# Patient Record
Sex: FEMALE | Race: BLACK OR AFRICAN AMERICAN | Employment: FULL TIME | URBAN - METROPOLITAN AREA
[De-identification: names, ages, dates, MRNs, and addresses within clinical notes are randomized per-mention and may not be internally consistent; named-entity substitution may affect disease eponyms.]

---

## 2019-12-16 ENCOUNTER — TELEPHONE (OUTPATIENT)
Dept: BARIATRICS/WEIGHT MGMT | Age: 49
End: 2019-12-16

## 2019-12-23 ENCOUNTER — APPOINTMENT (OUTPATIENT)
Dept: CT IMAGING | Age: 49
End: 2019-12-23
Payer: COMMERCIAL

## 2019-12-23 ENCOUNTER — APPOINTMENT (OUTPATIENT)
Dept: GENERAL RADIOLOGY | Age: 49
End: 2019-12-23
Payer: COMMERCIAL

## 2019-12-23 ENCOUNTER — HOSPITAL ENCOUNTER (EMERGENCY)
Age: 49
Discharge: HOME | End: 2019-12-23
Payer: COMMERCIAL

## 2019-12-23 VITALS
TEMPERATURE: 98 F | WEIGHT: 260 LBS | DIASTOLIC BLOOD PRESSURE: 79 MMHG | RESPIRATION RATE: 18 BRPM | SYSTOLIC BLOOD PRESSURE: 128 MMHG | OXYGEN SATURATION: 100 % | HEIGHT: 70 IN | HEART RATE: 70 BPM | BODY MASS INDEX: 37.22 KG/M2
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
## 2019-12-23 DIAGNOSIS — M25.511 ACUTE PAIN OF RIGHT SHOULDER: ICD-10-CM

## 2019-12-23 DIAGNOSIS — M54.50 ACUTE RIGHT-SIDED LOW BACK PAIN WITHOUT SCIATICA: ICD-10-CM

## 2019-12-23 LAB
INTERPRETATION: NORMAL
PREGNANCY, URINE: NEGATIVE
SPECIFIC GRAVITY, URINE: 1.02 (ref 1–1.03)

## 2019-12-23 PROCEDURE — 6360000002 HC RX W HCPCS: Performed by: PHYSICIAN ASSISTANT

## 2019-12-23 PROCEDURE — 99284 EMERGENCY DEPT VISIT MOD MDM: CPT

## 2019-12-23 PROCEDURE — 73030 X-RAY EXAM OF SHOULDER: CPT

## 2019-12-23 PROCEDURE — 96372 THER/PROPH/DIAG INJ SC/IM: CPT

## 2019-12-23 PROCEDURE — 81025 URINE PREGNANCY TEST: CPT

## 2019-12-23 PROCEDURE — 72131 CT LUMBAR SPINE W/O DYE: CPT

## 2019-12-23 RX ORDER — KETOROLAC TROMETHAMINE 30 MG/ML
15 INJECTION, SOLUTION INTRAMUSCULAR; INTRAVENOUS ONCE
Status: COMPLETED | OUTPATIENT
Start: 2019-12-23 | End: 2019-12-23

## 2019-12-23 RX ADMIN — KETOROLAC TROMETHAMINE 15 MG: 30 INJECTION, SOLUTION INTRAMUSCULAR at 19:38

## 2019-12-23 SDOH — HEALTH STABILITY: MENTAL HEALTH: HOW OFTEN DO YOU HAVE A DRINK CONTAINING ALCOHOL?: NEVER

## 2019-12-23 ASSESSMENT — PAIN DESCRIPTION - PAIN TYPE
TYPE: ACUTE PAIN
TYPE: ACUTE PAIN;CHRONIC PAIN

## 2019-12-23 ASSESSMENT — PAIN SCALES - GENERAL
PAINLEVEL_OUTOF10: 0
PAINLEVEL_OUTOF10: 6
PAINLEVEL_OUTOF10: 7

## 2020-01-08 LAB
ALBUMIN SERPL-MCNC: 4.2 G/DL
ALP BLD-CCNC: 60 U/L
ALT SERPL-CCNC: 16 U/L
ANION GAP SERPL CALCULATED.3IONS-SCNC: NORMAL MMOL/L
AST SERPL-CCNC: 18 U/L
AVERAGE GLUCOSE: ABNORMAL
BASOPHILS ABSOLUTE: 0 /ΜL
BASOPHILS RELATIVE PERCENT: 0 %
BILIRUB SERPL-MCNC: 0.2 MG/DL (ref 0.1–1.4)
BUN BLDV-MCNC: 10 MG/DL
CALCIUM SERPL-MCNC: 9.4 MG/DL
CHLORIDE BLD-SCNC: 104 MMOL/L
CO2: 23 MMOL/L
CREAT SERPL-MCNC: 0.68 MG/DL
EOSINOPHILS ABSOLUTE: 0.1 /ΜL
EOSINOPHILS RELATIVE PERCENT: 2 %
FERRITIN: 28 NG/ML (ref 9–150)
FOLATE: 17.3
GFR CALCULATED: 120
GLUCOSE BLD-MCNC: 94 MG/DL
HBA1C MFR BLD: 5.9 %
HCT VFR BLD CALC: 35.3 % (ref 36–46)
HEMOGLOBIN: 11.5 G/DL (ref 12–16)
IRON: 40
LYMPHOCYTES ABSOLUTE: 2.6 /ΜL
LYMPHOCYTES RELATIVE PERCENT: 40 %
MCH RBC QN AUTO: 28.2 PG
MCHC RBC AUTO-ENTMCNC: 32.6 G/DL
MCV RBC AUTO: 87 FL
MONOCYTES ABSOLUTE: 0.7 /ΜL
MONOCYTES RELATIVE PERCENT: 11 %
NEUTROPHILS ABSOLUTE: 3.1 /ΜL
NEUTROPHILS RELATIVE PERCENT: 47 %
PLATELET # BLD: 293 K/ΜL
PMV BLD AUTO: ABNORMAL FL
POTASSIUM SERPL-SCNC: 4 MMOL/L
RBC # BLD: 4.08 10^6/ΜL
SODIUM BLD-SCNC: 140 MMOL/L
TOTAL IRON BINDING CAPACITY: 331
TOTAL PROTEIN: 7.5
TSH SERPL DL<=0.05 MIU/L-ACNC: 1.29 UIU/ML
VITAMIN B-12: 322
VITAMIN D 25-HYDROXY: 22.1
VITAMIN D2, 25 HYDROXY: ABNORMAL
VITAMIN D3,25 HYDROXY: ABNORMAL
WBC # BLD: 6.5 10^3/ML

## 2020-02-05 ENCOUNTER — OFFICE VISIT (OUTPATIENT)
Dept: BARIATRICS/WEIGHT MGMT | Age: 50
End: 2020-02-05
Payer: COMMERCIAL

## 2020-02-05 VITALS
HEIGHT: 70 IN | HEART RATE: 62 BPM | SYSTOLIC BLOOD PRESSURE: 146 MMHG | DIASTOLIC BLOOD PRESSURE: 92 MMHG | BODY MASS INDEX: 41.95 KG/M2 | WEIGHT: 293 LBS

## 2020-02-05 PROCEDURE — 99204 OFFICE O/P NEW MOD 45 MIN: CPT | Performed by: NURSE PRACTITIONER

## 2020-02-05 RX ORDER — LISINOPRIL 5 MG/1
5 TABLET ORAL DAILY
COMMUNITY

## 2020-02-05 NOTE — PROGRESS NOTES
Subjective     Chief Complaint   Patient presents with    New Patient     NP asking about new medication       Vitals:    02/05/20 1617   BP: (!) 146/92   Pulse: 62     Wt Readings from Last 3 Encounters:   02/05/20 (!) 319 lb 12.8 oz (145.1 kg)   12/23/19 260 lb (117.9 kg)     The patient first recognized that she had a weight problem about 5 years ago. The patient's lowest weight in the last five years was 240 lbs, and the highest weight in the last five years was 319. lbs. Weight Loss Program History:  The patient states she has tried adipex  The most weight lost ever with diet and exercise was 10 Lbs, but unfortunately only kept them of for 4months. These attempts have been temporarily successful with weight loss, but have failed to sustain adequate weight loss. Pt has high bp when using most weight loss medications but is trying to show she is trying she has bcbs and they will not approve otherwise. No Psychiatric history. Adipex over 16 years ago. States did well but HTN. No History of eating disorders  3 Juices per day frequently, borderline DM. No ETOH or drug use. Eats out a lot, kurt[employer] and fast food. Busy with daughter in sports. Water intake 2 bottles per day. Teacher at [employer]. Right knee pain, currently getting knee injections. States bone against bone. HTN borderline. Mia's life is affected by weight related to below comorbid conditions. The patient has also tried self directed diet and exercise in attempts to sustain weight loss. Comorbid Conditions:  Significant diseases effecting this patient are   Past Medical History:   Diagnosis Date    Asthma     Hypertension      Thoroughly reviewed the patient's medical history, family history, social history and review of systems with the patient today in the office. Please see medical record for pertinent positives. Allergies:  No Known Allergies    Past Surgical History:  History reviewed.  No pertinent surgical history. Family History:  No family history on file. Social History:  Social History     Socioeconomic History    Marital status:      Spouse name: Not on file    Number of children: Not on file    Years of education: Not on file    Highest education level: Not on file   Occupational History    Not on file   Social Needs    Financial resource strain: Not on file    Food insecurity:     Worry: Not on file     Inability: Not on file    Transportation needs:     Medical: Not on file     Non-medical: Not on file   Tobacco Use    Smoking status: Never Smoker    Smokeless tobacco: Never Used   Substance and Sexual Activity    Alcohol use: Not Currently     Frequency: Never    Drug use: Not on file    Sexual activity: Not Currently   Lifestyle    Physical activity:     Days per week: Not on file     Minutes per session: Not on file    Stress: Not on file   Relationships    Social connections:     Talks on phone: Not on file     Gets together: Not on file     Attends Hoahaoism service: Not on file     Active member of club or organization: Not on file     Attends meetings of clubs or organizations: Not on file     Relationship status: Not on file    Intimate partner violence:     Fear of current or ex partner: Not on file     Emotionally abused: Not on file     Physically abused: Not on file     Forced sexual activity: Not on file   Other Topics Concern    Not on file   Social History Narrative    Not on file       Review of Systems - History obtained from the patient.     Review of Systems - General ROS: negative for - chills, fever, hot flashes or night sweats  ENT ROS: negative for - headaches, oral lesions, sore throat, vertigo or visual changes  Allergy and Immunology ROS: negative for - hives or nasal congestion  Endocrine ROS: negative for - hair pattern changes, mood swings or polydipsia/polyuria  Respiratory ROS: no cough, shortness of breath, or wheezing  Cardiovascular ROS: no chest pain or dyspnea on exertion, +HTN  Gastrointestinal ROS: no abdominal pain, change in bowel habits, or black or bloody stools  Genito-Urinary ROS: no dysuria, incontinence, trouble voiding, or hematuria  Musculoskeletal ROS: right knee pain, injection given. Neurological ROS: negative for - behavioral changes, dizziness, headaches, impaired coordination/balance, numbness/tingling or seizures  Dermatological ROS: negative for - eczema or nail changes  Psychological ROS: negative for - anxiety, depression or suicidal ideation     Objective     Physical Exam   Constitutional: Oriented to person, place, and time and well-developed, well-nourished, and in no distress. No distress. Obese   HENT: Corrective lenses. Head: Normocephalic and atraumatic. Eyes: Conjunctivae are normal. Pupils are equal, round, and reactive to light. Neck: Normal range of motion. Neck supple. Cardiovascular: Normal rate, regular rhythm, normal heart sounds and intact distal pulses. No murmur heard. Pulmonary/Chest: Effort normal and breath sounds normal. No respiratory distress. Abdominal: Soft. Bowel sounds are normal. Exhibits no distension. There is no tenderness. Large. Musculoskeletal: Mild edema +1 bilateral.  Lymphadenopathy: Deferred. Neurological: She is alert and oriented to person, place, and time. No cranial nerve deficit. Skin: Skin is warm. She is not diaphoretic. Psychiatric: Mood, memory, affect and judgment normal.     Assessment  and Plan  Discussed candidacy for weight loss medication: BMI of >30 at 46. Patient has failed to lose 5% of body weight for 20 years. Belviq/Lorcaserin Hydrochloride is a serotonin 2C receptor agonist indicated as an adjunct to a reeducated-calorie diet and increased physical activity for chronic weight management in adults. Drug is considered CIV. Was studied in geriatric >60, 1 year long study.  Patient was given [facility] medication information handout today on Belviq. Dosing: 10 mg BID or 20 mg daily. Do not crush or divide. Can be taken with or without food. Laymond Coupe for patients >70 yo with no hx of renal impairment. Contraindications: Pregnancy (category X), CHF, valve problems, bradycardia, diabetes, SSRI's, SNRI's, triptan use, tricyclic antidepressants, MAOIs, and any hypersensitivity. Warning/precautions, serotonin syndrome, valvular heart disease, psychiatric disorders, DM, decrease in HR, priapism, heme changes, prolactin elevation and pulm htn. Possible SE: Nausea, diarrhea, constipation, dizziness, dry mouth, hypoglycemia, headache, and fatigue. Plan    1. Essential hypertension  - Borderline controlled. - COnitnue lisinopril.   - Belviq may help with HTN control. Study some reducation in HTN/HR>     2. Morbid obesity due to excess calories ([facility] [address]  - Discussed weight loss program with patient and the metabolic components of obesity and insulin resistance over time. - Ultimately will need to change overall eating behaviors to have success in continued management with weight loss. - Will continue to journal food items and discussed the below recommendations to patient. - Encouraged her to weigh daily and work towards a goal of 1-2 pounds weekly. - Need to call for lab records, RTC 2 weeks for new medication group class with completed workup and RTC 6 wks with NP. Planning to start Belviq. Consider others once loses weight and HTN better controlled etc.     Patient was encouraged to journal all food intake using blue journal given. Keep calorie level at approximately 2445-1493. Protein intake is to be a minimum of 60 grams per day. Water drinking was encouraged with a goal of 64oz-128oz daily. Beverages to be calorie free except for milk. Every other beverage should be water. They are to avoid soda.      I spent 45 minutes with patient and More than 50% of the office visit today was spent in face to face counseling regarding diet and exercise, counting calories, complying with the diet recommendations, and complying with the work up of dietary counseling. Patient counseled on the benefits of treatment plan at length while in the office today. Patient states an understanding and willingness to proceed with the recommended weight loss plan. No orders of the defined types were placed in this encounter. No orders of the defined types were placed in this encounter. Follow Up:  Return in about 2 weeks (around 2/19/2020) for New Medication Group Class.     Ban Cordova, CNP

## 2020-02-13 ENCOUNTER — TELEPHONE (OUTPATIENT)
Dept: BARIATRICS/WEIGHT MGMT | Age: 50
End: 2020-02-13

## 2020-02-13 NOTE — TELEPHONE ENCOUNTER
Fidelia Code came in to provide information. [facility] phone number to contact for pt to receive medication is [phone] [facility].

## 2020-02-17 ENCOUNTER — TELEPHONE (OUTPATIENT)
Dept: BARIATRICS/WEIGHT MGMT | Age: 50
End: 2020-02-17

## 2020-02-17 NOTE — TELEPHONE ENCOUNTER
Called patient in regards to [address], she has not been prescribed this medication yet. She voiced understanding about the information, a new medication will be discussed at group class.

## 2020-02-25 ENCOUNTER — TELEPHONE (OUTPATIENT)
Dept: BARIATRICS/WEIGHT MGMT | Age: 50
End: 2020-02-25

## 2020-02-25 NOTE — TELEPHONE ENCOUNTER
----- Message from STACEY Zavaleta CNP sent at 2/24/2020  3:13 PM EST -----  Please call on lab records. Need asap or reschedule till next week.    Jessica Sorto

## 2020-02-25 NOTE — TELEPHONE ENCOUNTER
Called patient to advise we have not received labs yet from her PCP. No answer and no voicemail.  Called Dr. Umer Dickinson office they will fax most recent labs from 1/2020

## 2020-02-26 ENCOUNTER — OFFICE VISIT (OUTPATIENT)
Dept: BARIATRICS/WEIGHT MGMT | Age: 50
End: 2020-02-26
Payer: COMMERCIAL

## 2020-02-26 VITALS
BODY MASS INDEX: 41.95 KG/M2 | OXYGEN SATURATION: 98 % | HEIGHT: 70 IN | SYSTOLIC BLOOD PRESSURE: 134 MMHG | HEART RATE: 78 BPM | WEIGHT: 293 LBS | DIASTOLIC BLOOD PRESSURE: 88 MMHG

## 2020-02-26 PROCEDURE — 99215 OFFICE O/P EST HI 40 MIN: CPT | Performed by: NURSE PRACTITIONER

## 2020-02-26 ASSESSMENT — ENCOUNTER SYMPTOMS
WHEEZING: 0
SHORTNESS OF BREATH: 0
ABDOMINAL PAIN: 0
TROUBLE SWALLOWING: 0
ABDOMINAL DISTENTION: 0
NAUSEA: 0
RHINORRHEA: 0
CHEST TIGHTNESS: 0
EYE PAIN: 0
DIARRHEA: 0

## 2020-02-26 NOTE — PROGRESS NOTES
Wendy Preston Chris  1970  51 y.o. SUBJECT BK:    Chief Complaint   Patient presents with    Weight Management     Medication Group Class     Past Medical History:   Diagnosis Date    Asthma     Hypertension      No past surgical history on file. Current Outpatient Medications   Medication Sig Dispense Refill    orlistat (XENICAL) 120 MG capsule Take 1 capsule by mouth 3 times daily (with meals) Start with BID and then increase after 5 days to TID. 90 capsule 0    lisinopril (PRINIVIL;ZESTRIL) 5 MG tablet Take 5 mg by mouth daily       No current facility-administered medications for this visit. No family history on file. No Known Allergies     HPI  HPI: Daly Antoine presents today for new medication group class by this provider. Patient had an initial individual provider consult and is here for their group education class. At initial consult weight loss medication was decided based on current BMI, prior attempts, and exclusion criteria based on past medical history. Patient had work up completed and was reviewed with patient today. No new medications, recent illnesses or new medical history. A1C 5.9. Review of Systems   Constitutional: Negative. Negative for appetite change, fatigue and fever. HENT: Negative for congestion, dental problem, hearing loss, rhinorrhea and trouble swallowing. Eyes: Negative for pain. Respiratory: Negative for chest tightness, shortness of breath and wheezing. Cardiovascular: Negative for chest pain, palpitations and leg swelling. Htn   Gastrointestinal: Negative for abdominal distention, abdominal pain, diarrhea and nausea. Endocrine: Negative for cold intolerance and polydipsia. Pre dm. Genitourinary: Negative for difficulty urinating and frequency. Musculoskeletal: Negative for arthralgias and gait problem. Skin: Negative for rash. Allergic/Immunologic: Negative for environmental allergies.    Neurological: Negative for dizziness, seizures and syncope. Hematological: Does not bruise/bleed easily. Psychiatric/Behavioral: Negative for behavioral problems and suicidal ideas. OBJECTIVE:     /88 (Site: Right Upper Arm, Position: Standing, Cuff Size: Large Adult)   Pulse 78   Ht 5' 10\" (1.778 m)   Wt (!) 319 lb 8 oz (144.9 kg)   SpO2 98%   BMI 45.84 kg/m²   Wt Readings from Last 3 Encounters:   02/26/20 (!) 319 lb 8 oz (144.9 kg)   02/05/20 (!) 319 lb 12.8 oz (145.1 kg)   12/23/19 260 lb (117.9 kg)       Physical Exam  Vitals signs and nursing note reviewed. Constitutional:       Appearance: She is well-developed. Comments: Obese   HENT:      Head: Normocephalic and atraumatic. Right Ear: Hearing and ear canal normal.      Left Ear: Hearing and ear canal normal.      Nose: Nose normal.      Mouth/Throat:      Pharynx: Uvula midline. Eyes:      Conjunctiva/sclera: Conjunctivae normal.      Pupils: Pupils are equal, round, and reactive to light. Neck:      Musculoskeletal: Normal range of motion. Cardiovascular:      Rate and Rhythm: Normal rate and regular rhythm. Heart sounds: Normal heart sounds. Pulmonary:      Effort: Pulmonary effort is normal.      Breath sounds: Normal breath sounds. No decreased breath sounds, wheezing, rhonchi or rales. Abdominal:      General: Bowel sounds are normal.      Palpations: Abdomen is soft. Tenderness: There is no abdominal tenderness. Musculoskeletal: Normal range of motion. General: No tenderness. Comments: In all 4 extremities. Skin:     General: Skin is warm and dry. Findings: No rash. Neurological:      Mental Status: She is alert and oriented to person, place, and time. GCS: GCS eye subscore is 4. GCS verbal subscore is 5. GCS motor subscore is 6. Motor: No abnormal muscle tone. Psychiatric:         Behavior: Behavior normal.         Judgment: Judgment normal.         ASSESSMENT/ PLAN:    1.

## 2020-03-18 ENCOUNTER — OFFICE VISIT (OUTPATIENT)
Dept: BARIATRICS/WEIGHT MGMT | Age: 50
End: 2020-03-18
Payer: COMMERCIAL

## 2020-03-18 VITALS
DIASTOLIC BLOOD PRESSURE: 88 MMHG | HEART RATE: 67 BPM | HEIGHT: 70 IN | BODY MASS INDEX: 41.95 KG/M2 | SYSTOLIC BLOOD PRESSURE: 140 MMHG | WEIGHT: 293 LBS | OXYGEN SATURATION: 97 %

## 2020-03-18 PROCEDURE — 99213 OFFICE O/P EST LOW 20 MIN: CPT | Performed by: NURSE PRACTITIONER

## 2020-03-18 ASSESSMENT — ENCOUNTER SYMPTOMS
SHORTNESS OF BREATH: 0
WHEEZING: 0
EYE PAIN: 0
CHEST TIGHTNESS: 0
ABDOMINAL PAIN: 0
DIARRHEA: 0
TROUBLE SWALLOWING: 0
ABDOMINAL DISTENTION: 0
BACK PAIN: 1
NAUSEA: 0
RHINORRHEA: 0

## 2020-03-18 NOTE — PROGRESS NOTES
avoid high fat foods. Will increase to orlistat if patient has coverage and tolerates well. - orlistat (XENICAL) 120 MG capsule; Take 1 capsule by mouth 3 times daily (with meals) Start with BID and then increase after 5 days to TID. Dispense: 90 capsule; Refill: 0  - RTC 1 month with NP.     2. Essential hypertension  - Elevated today, did not take medication.   - PCP managing. Continue lisinopril. No orders of the defined types were placed in this encounter. Return in about 1 month (around 4/18/2020).     Coni Moran, CNP

## 2020-03-24 ENCOUNTER — TELEPHONE (OUTPATIENT)
Dept: BARIATRICS/WEIGHT MGMT | Age: 50
End: 2020-03-24

## 2020-04-29 ENCOUNTER — HOSPITAL ENCOUNTER (OUTPATIENT)
Age: 50
Setting detail: SPECIMEN
Discharge: HOME | End: 2020-04-29
Payer: COMMERCIAL

## 2020-04-29 PROCEDURE — 87071 CULTURE AEROBIC QUANT OTHER: CPT

## 2020-05-04 LAB
CULTURE: ABNORMAL
GRAM SMEAR: ABNORMAL
Lab: ABNORMAL
SPECIMEN: ABNORMAL

## 2020-10-21 ENCOUNTER — HOSPITAL ENCOUNTER (OUTPATIENT)
Dept: NUCLEAR MEDICINE | Age: 50
Discharge: HOME | End: 2020-10-21
Payer: COMMERCIAL

## 2020-10-21 VITALS — WEIGHT: 293 LBS | BODY MASS INDEX: 45.48 KG/M2

## 2020-10-21 PROCEDURE — A9537 TC99M MEBROFENIN: HCPCS | Performed by: FAMILY MEDICINE

## 2020-10-21 PROCEDURE — 6360000002 HC RX W HCPCS: Performed by: FAMILY MEDICINE

## 2020-10-21 PROCEDURE — 78227 HEPATOBIL SYST IMAGE W/DRUG: CPT

## 2020-10-21 PROCEDURE — 3430000000 HC RX DIAGNOSTIC RADIOPHARMACEUTICAL: Performed by: FAMILY MEDICINE

## 2020-10-21 PROCEDURE — 2580000003 HC RX 258: Performed by: FAMILY MEDICINE

## 2020-10-21 RX ADMIN — SODIUM CHLORIDE 2.88 MCG: 9 INJECTION, SOLUTION INTRAVENOUS at 14:05

## 2020-10-21 RX ADMIN — Medication 5 MILLICURIE: at 13:05

## 2020-10-26 ENCOUNTER — OFFICE VISIT (OUTPATIENT)
Dept: SURGERY | Age: 50
End: 2020-10-26
Payer: COMMERCIAL

## 2020-10-26 VITALS
OXYGEN SATURATION: 99 % | WEIGHT: 293 LBS | BODY MASS INDEX: 43.4 KG/M2 | SYSTOLIC BLOOD PRESSURE: 142 MMHG | HEART RATE: 76 BPM | RESPIRATION RATE: 14 BRPM | TEMPERATURE: 98.1 F | DIASTOLIC BLOOD PRESSURE: 84 MMHG | HEIGHT: 69 IN

## 2020-10-26 PROCEDURE — 99204 OFFICE O/P NEW MOD 45 MIN: CPT | Performed by: SURGERY

## 2020-10-26 ASSESSMENT — ENCOUNTER SYMPTOMS
CONSTIPATION: 0
NAUSEA: 1
PHOTOPHOBIA: 0
ABDOMINAL PAIN: 1
BACK PAIN: 0
APNEA: 0
CHOKING: 0
STRIDOR: 0
RECTAL PAIN: 0
ANAL BLEEDING: 0
EYE REDNESS: 0
COLOR CHANGE: 0
EYE ITCHING: 0
SORE THROAT: 0

## 2020-10-26 NOTE — PROGRESS NOTES
Chief Complaint   Patient presents with    New Patient     NP Gallbladder ref Dr Nasima Mejia:  HPI:  Patientcomplains of abdominal pain. Pain is located in the LUQ, epigastric with radiation to back. The pain is described as cramping and pressure-like. Onset was 6 months ago. Symptoms have been gradually worsening since. Aggravating factors: fatty foods. Associated symptoms: nausea. The patient denies chills and fever. I have reviewed the patient's(pertinent information to this visit) medical history, family history(scanned in  the Media tab under \"patient questioner\"), social history and reviewof systems with the patient today in the office. No past surgical history on file. Past Medical History:   Diagnosis Date    Asthma     Hypertension      No family history on file.   Social History     Socioeconomic History    Marital status:      Spouse name: Not on file    Number of children: Not on file    Years of education: Not on file    Highest education level: Not on file   Occupational History    Not on file   Social Needs    Financial resource strain: Not on file    Food insecurity     Worry: Not on file     Inability: Not on file    Transportation needs     Medical: Not on file     Non-medical: Not on file   Tobacco Use    Smoking status: Never Smoker    Smokeless tobacco: Never Used   Substance and Sexual Activity    Alcohol use: Not Currently     Frequency: Never    Drug use: Not on file    Sexual activity: Not Currently   Lifestyle    Physical activity     Days per week: Not on file     Minutes per session: Not on file    Stress: Not on file   Relationships    Social connections     Talks on phone: Not on file     Gets together: Not on file     Attends [employer] service: Not on file     Active member of club or organization: Not on file     Attends meetings of clubs or organizations: Not on file     Relationship status: Not on file    Intimate partner violence Musculoskeletal: Normal range of motion and neck supple. Cardiovascular:      Rate and Rhythm: Normal rate. Pulmonary:      Effort: Pulmonary effort is normal.   Abdominal:      General: There is no distension. Palpations: Abdomen is soft. There is no mass. Tenderness: There is no abdominal tenderness. There is no guarding or rebound. Musculoskeletal: Normal range of motion. Skin:     General: Skin is warm. Neurological:      Mental Status: She is alert and oriented to person, place, and time. ASSESSMENT:  1. Epigastric abdominal pain        PLAN:  Treatment: Will proceed with EGD. If EGDs negative then will proceed with laparoscopic cholecystectomy. Her ultrasound and HIDA scan were negative. Patient did have symptoms during HIDA scan. Patient counseled on risks, benefits, and alternatives of treatment plan at length today. Patient states an understanding and willingness to proceed with plan. No orders of the defined types were placed in this encounter. No orders of the defined types were placed in this encounter. Follow Up:  No follow-ups on file.       Ghassan Marvin MD

## 2020-10-30 ENCOUNTER — TELEPHONE (OUTPATIENT)
Dept: SURGERY | Age: 50
End: 2020-10-30

## 2020-10-30 NOTE — TELEPHONE ENCOUNTER
SENT SMS MESSAGE - LEFT MESSAGE FOR Mia Perez REGARDING SURGERY (EGD) SCHEDULED @ [facility].  NOTIFIED OF DATES, TIMES AND LOCATION    PHONE ASSESSMENT   COVID 11/9/20 @ 330- GUESTS ARE NOT ALLOWED  SURGERY - 11/13/20 @ 900  P/O - 11/19/20 @ 115    NPO AFTER MIDNIGHT  HOLD BLOOD THINNERS

## 2020-12-14 ENCOUNTER — HOSPITAL ENCOUNTER (OUTPATIENT)
Dept: WOUND CARE | Age: 50
Discharge: HOME | End: 2020-12-14
Payer: COMMERCIAL

## 2020-12-14 VITALS
HEIGHT: 70 IN | SYSTOLIC BLOOD PRESSURE: 184 MMHG | TEMPERATURE: 97.5 F | RESPIRATION RATE: 16 BRPM | HEART RATE: 72 BPM | WEIGHT: 285 LBS | DIASTOLIC BLOOD PRESSURE: 93 MMHG | BODY MASS INDEX: 40.8 KG/M2

## 2020-12-14 PROBLEM — L02.412 ABSCESS OF AXILLA, LEFT: Status: ACTIVE | Noted: 2020-12-14

## 2020-12-14 PROBLEM — T14.8XXA NONHEALING NONSURGICAL WOUND WITH FAT LAYER EXPOSED: Status: ACTIVE | Noted: 2020-12-14

## 2020-12-14 PROCEDURE — 87070 CULTURE OTHR SPECIMN AEROBIC: CPT

## 2020-12-14 PROCEDURE — 10060 I&D ABSCESS SIMPLE/SINGLE: CPT

## 2020-12-14 PROCEDURE — 99203 OFFICE O/P NEW LOW 30 MIN: CPT | Performed by: NURSE PRACTITIONER

## 2020-12-14 PROCEDURE — 87186 SC STD MICRODIL/AGAR DIL: CPT

## 2020-12-14 PROCEDURE — 87075 CULTR BACTERIA EXCEPT BLOOD: CPT

## 2020-12-14 PROCEDURE — 87077 CULTURE AEROBIC IDENTIFY: CPT

## 2020-12-14 PROCEDURE — 99213 OFFICE O/P EST LOW 20 MIN: CPT

## 2020-12-14 PROCEDURE — 10060 I&D ABSCESS SIMPLE/SINGLE: CPT | Performed by: NURSE PRACTITIONER

## 2020-12-14 RX ORDER — BACITRACIN ZINC AND POLYMYXIN B SULFATE 500; 1000 [USP'U]/G; [USP'U]/G
OINTMENT TOPICAL ONCE
Status: CANCELLED | OUTPATIENT
Start: 2020-12-14 | End: 2020-12-14

## 2020-12-14 RX ORDER — BACITRACIN, NEOMYCIN, POLYMYXIN B 400; 3.5; 5 [USP'U]/G; MG/G; [USP'U]/G
OINTMENT TOPICAL ONCE
Status: CANCELLED | OUTPATIENT
Start: 2020-12-14 | End: 2020-12-14

## 2020-12-14 RX ORDER — LIDOCAINE 50 MG/G
OINTMENT TOPICAL ONCE
Status: CANCELLED | OUTPATIENT
Start: 2020-12-14 | End: 2020-12-14

## 2020-12-14 RX ORDER — LIDOCAINE HYDROCHLORIDE 40 MG/ML
SOLUTION TOPICAL ONCE
Status: CANCELLED | OUTPATIENT
Start: 2020-12-14 | End: 2020-12-14

## 2020-12-14 RX ORDER — GINSENG 100 MG
CAPSULE ORAL ONCE
Status: CANCELLED | OUTPATIENT
Start: 2020-12-14 | End: 2020-12-14

## 2020-12-14 RX ORDER — LIDOCAINE HYDROCHLORIDE 20 MG/ML
JELLY TOPICAL ONCE
Status: CANCELLED | OUTPATIENT
Start: 2020-12-14 | End: 2020-12-14

## 2020-12-14 RX ORDER — POTASSIUM CHLORIDE 750 MG/1
10 CAPSULE, EXTENDED RELEASE ORAL 2 TIMES DAILY
COMMUNITY
Start: 2020-12-14

## 2020-12-14 RX ORDER — GENTAMICIN SULFATE 1 MG/G
OINTMENT TOPICAL ONCE
Status: CANCELLED | OUTPATIENT
Start: 2020-12-14 | End: 2020-12-14

## 2020-12-14 RX ORDER — CLOBETASOL PROPIONATE 0.5 MG/G
OINTMENT TOPICAL ONCE
Status: CANCELLED | OUTPATIENT
Start: 2020-12-14 | End: 2020-12-14

## 2020-12-14 RX ORDER — BETAMETHASONE DIPROPIONATE 0.05 %
OINTMENT (GRAM) TOPICAL ONCE
Status: CANCELLED | OUTPATIENT
Start: 2020-12-14 | End: 2020-12-14

## 2020-12-14 ASSESSMENT — PAIN DESCRIPTION - DESCRIPTORS: DESCRIPTORS: BURNING

## 2020-12-14 ASSESSMENT — PAIN DESCRIPTION - ONSET: ONSET: ON-GOING

## 2020-12-14 ASSESSMENT — PAIN DESCRIPTION - FREQUENCY: FREQUENCY: CONTINUOUS

## 2020-12-14 ASSESSMENT — PAIN DESCRIPTION - PAIN TYPE: TYPE: ACUTE PAIN

## 2020-12-14 ASSESSMENT — PAIN - FUNCTIONAL ASSESSMENT: PAIN_FUNCTIONAL_ASSESSMENT: ACTIVITIES ARE NOT PREVENTED

## 2020-12-14 ASSESSMENT — PAIN DESCRIPTION - ORIENTATION: ORIENTATION: LEFT

## 2020-12-14 ASSESSMENT — PAIN DESCRIPTION - LOCATION: LOCATION: ABDOMEN

## 2020-12-14 ASSESSMENT — PAIN DESCRIPTION - PROGRESSION: CLINICAL_PROGRESSION: NOT CHANGED

## 2020-12-14 ASSESSMENT — PAIN SCALES - GENERAL: PAINLEVEL_OUTOF10: 4

## 2020-12-14 NOTE — PROGRESS NOTES
[facility] Initial Visit      Hernandez Alonzo  AGE: 53 y.o. GENDER: female  : 1970  EPISODE DATE:  2020   Referred by: Dr. Faviola Montano:     Connor Loyd LEFT AXILLA     HISTORY of PRESENT ILLNESS      Hernandez Alonzo is a 53 y.o. female who presents to the [address] [place] [address] for an initial visit for evaluation and treatment of Acute non-healing surgical  wound of abdomen/pelvis region post hysterectomy 2020. The condition is of mild severity. The ulcer has been present for aproximately 1-2 weeks. She also has an abscess in the left axilla present for approximately 4 days. The underlying cause is thought to be nonhealing surgical and infection. The patients care to date has included evaluation at the walk in clinic and Bactrim DS for 10 days which was started 2020. The patient has significant underlying medical conditions as below.      Wound Pain Timing/Severity: waxing and waning, moderate  Quality of pain: aching, tender  Severity of pain:  4 / 10   Modifying Factors: obesity and prediabetes  Associated Signs/Symptoms: edema  The wound pain is related to the abscess, she has no pain with the nonhealing surgical.        PAST MEDICAL HISTORY        Diagnosis Date    Asthma     Hypertension        PAST SURGICAL HISTORY    Past Surgical History:   Procedure Laterality Date    HYSTERECTOMY         FAMILY HISTORY    Family History   Problem Relation Age of Onset    Diabetes Maternal Grandfather        SOCIAL HISTORY    Social History     Tobacco Use    Smoking status: Never Smoker    Smokeless tobacco: Never Used   Substance Use Topics    Alcohol use: Not Currently     Frequency: Never    Drug use: Never       ALLERGIES    No Known Allergies    MEDICATIONS    Current Outpatient Medications on File Prior to Encounter   Medication Sig Dispense Refill    potassium chloride (MICRO-K) 10 MEQ extended release capsule Take 10 mEq by mouth 2 times daily      orlistat (XENICAL) 120 MG capsule Take 1 capsule by mouth 3 times daily (with meals) Start with BID and then increase after 5 days to TID. 90 capsule 0    lisinopril (PRINIVIL;ZESTRIL) 5 MG tablet Take 5 mg by mouth daily       No current facility-administered medications on file prior to encounter. PROBLEM LIST    Patient Active Problem List   Diagnosis    WD-Nonhealing nonsurgical abdomen wound with fat layer exposed    WD-Abscess of axilla, left       REVIEW OF SYSTEMS    Constitutional: negative for anorexia, chills, fatigue, fevers, malaise, sweats and weight loss  Respiratory: negative for cough, dyspnea on exertion, hemoptysis, pleurisy/chest pain, shortness of breath, sputum, stridor and wheezing  Cardiovascular: negative for chest pain, chest pressure/discomfort, dyspnea, exertional chest pressure/discomfort, fatigue, irregular heart beat, near-syncope, orthopnea, palpitations, paroxysmal nocturnal dyspnea, syncope and tachypnea  Integument/breast: positive for skin lesion(s) nonhealing surgical wound abdomen/pelvis and abscess left axilla. Objective:      BP (!) 184/93   Pulse 72   Temp 97.5 °F (36.4 °C) (Temporal)   Resp 16   Ht 5' 10\" (1.778 m)   Wt 285 lb (129.3 kg)   BMI 40.89 kg/m²     PHYSICAL EXAM  General Appearance: alert and oriented to person, place and time, well-developed and well-nourished, in no acute distress  Pulmonary/Chest: clear to auscultation bilaterally- no wheezes, rales or rhonchi, normal air movement, no respiratory distress  Cardiovascular: normal rate, normal S1 and S2, no gallops and intact distal pulses  Dermatologic exam: Visual inspection of the periwound reveals the skin to be edematous  Wound exam: see wound description below in procedure note      Assessment:       Bailey Noriega  appears to have a non-healing wound of the abdomen/pelvis and abscess left axilla.  The etiology of the wound is felt to be non-healing surgical and infection. There are multiple complicating factors including edema, obesity and prediabetes. A comprehensive wound management program would be helpful to heal this wound. Assessments completed include fall risk and nutritional, functional,and psychological status. At this time appropriate care would include: periodic debridement and wound care as below.      Problem List Items Addressed This Visit     WD-Nonhealing nonsurgical abdomen wound with fat layer exposed    WD-Abscess of axilla, left          Post  Debridement Measurements:  Wound 12/14/20 Pelvis Anterior #1 Abdomen (Active)   Wound Image   12/14/20 1044   Dressing Status New dressing applied 12/14/20 1120   Wound Cleansed Cleansed with saline 12/14/20 1044   Wound Length (cm) 0.3 cm 12/14/20 1044   Wound Width (cm) 1 cm 12/14/20 1044   Wound Depth (cm) 0.1 cm 12/14/20 1044   Wound Surface Area (cm^2) 0.3 cm^2 12/14/20 1044   Wound Volume (cm^3) 0.03 cm^3 12/14/20 1044   Distance Tunneling (cm) 0 cm 12/14/20 1044   Tunneling Position ___ O'Clock 0 12/14/20 1044   Undermining Starts ___ O'Clock 0 12/14/20 1044   Undermining Ends___ O'Clock 0 12/14/20 1044   Undermining Maxium Distance (cm) 0 12/14/20 1044   Wound Assessment Pink/red 12/14/20 1044   Drainage Amount Moderate 12/14/20 1044   Drainage Description Yellow 12/14/20 1044   Odor None 12/14/20 1044   Coni-wound Assessment Intact 12/14/20 1044   Margins Defined edges 12/14/20 1044   Wound Thickness Description not for Pressure Injury Full thickness 12/14/20 1044   Number of days: 0       Wound 12/14/20 Left #2 Left Underarm (Active)   Wound Image   12/14/20 1044   Dressing Status New dressing applied 12/14/20 1120   Wound Cleansed Cleansed with saline 12/14/20 1044   Wound Length (cm) 0.5 cm 12/14/20 1044   Wound Width (cm) 1.5 cm 12/14/20 1044   Wound Depth (cm) 0.1 cm 12/14/20 1044   Wound Surface Area (cm^2) 0.75 cm^2 12/14/20 1044   Wound Volume (cm^3) 0.08 cm^3 12/14/20 1044   Distance Tunneling (cm) 0 cm 12/14/20 1044   Tunneling Position ___ O'Clock 0 12/14/20 1044   Undermining Starts ___ O'Clock 0 12/14/20 1044   Undermining Ends___ O'Clock 0 12/14/20 1044   Undermining Maxium Distance (cm) 0 12/14/20 1044   Wound Assessment Pink/red 12/14/20 1044   Drainage Amount Moderate 12/14/20 1044   Drainage Description Yellow 12/14/20 1044   Odor None 12/14/20 1044   Coni-wound Assessment Intact 12/14/20 1044   Margins Defined edges 12/14/20 1044   Wound Thickness Description not for Pressure Injury Full thickness 12/14/20 1044   Number of days: 0         Incision and Drainage Note    Type of Incision and Drainage:    [x] Simple    [] Complex    [] Hematoma/Seroma  [] Abscess soft tissue deep   [] Abscess ankle       The left axilla abscess was inspected and appeared acutely fluctuant and erythematous.     Performed by: STACEY Huddleston CNP    Consent obtained: Yes    Time out taken: Yes    Pain Control: Anesthetic: 5% Lidocaine Ointment Topical     Drainage Type: purulent    Instruments: cotton tip applicator and manual pressure    Location of Incision and Drainage:    Wound #: 2    Incision and Drainage Size cm  Wound 12/14/20 Pelvis Anterior #1 Abdomen (Active)   Wound Image   12/14/20 1044   Dressing Status New dressing applied 12/14/20 1120   Wound Cleansed Cleansed with saline 12/14/20 1044   Wound Length (cm) 0.3 cm 12/14/20 1044   Wound Width (cm) 1 cm 12/14/20 1044   Wound Depth (cm) 0.1 cm 12/14/20 1044   Wound Surface Area (cm^2) 0.3 cm^2 12/14/20 1044   Wound Volume (cm^3) 0.03 cm^3 12/14/20 1044   Distance Tunneling (cm) 0 cm 12/14/20 1044   Tunneling Position ___ O'Clock 0 12/14/20 1044   Undermining Starts ___ O'Clock 0 12/14/20 1044   Undermining Ends___ O'Clock 0 12/14/20 1044   Undermining Maxium Distance (cm) 0 12/14/20 1044   Wound Assessment Pink/red 12/14/20 1044   Drainage Amount Moderate 12/14/20 1044   Drainage Description Yellow 12/14/20 1044   Odor None 12/14/20 1044 Coni-wound Assessment Intact 12/14/20 1044   Margins Defined edges 12/14/20 1044   Wound Thickness Description not for Pressure Injury Full thickness 12/14/20 1044   Number of days: 0       Wound 12/14/20 Left #2 Left Underarm (Active)   Wound Image   12/14/20 1044   Dressing Status New dressing applied 12/14/20 1120   Wound Cleansed Cleansed with saline 12/14/20 1044   Wound Length (cm) 0.5 cm 12/14/20 1044   Wound Width (cm) 1.5 cm 12/14/20 1044   Wound Depth (cm) 0.1 cm 12/14/20 1044   Wound Surface Area (cm^2) 0.75 cm^2 12/14/20 1044   Wound Volume (cm^3) 0.08 cm^3 12/14/20 1044   Distance Tunneling (cm) 0 cm 12/14/20 1044   Tunneling Position ___ O'Clock 0 12/14/20 1044   Undermining Starts ___ O'Clock 0 12/14/20 1044   Undermining Ends___ O'Clock 0 12/14/20 1044   Undermining Maxium Distance (cm) 0 12/14/20 1044   Wound Assessment Pink/red 12/14/20 1044   Drainage Amount Moderate 12/14/20 1044   Drainage Description Yellow 12/14/20 1044   Odor None 12/14/20 1044   Coni-wound Assessment Intact 12/14/20 1044   Margins Defined edges 12/14/20 1044   Wound Thickness Description not for Pressure Injury Full thickness 12/14/20 1044   Number of days: 0        Culture sent: Yes     Bleeding:with a small amount of bleeding    Hemostasis Achieved: by pressure    Procedural Pain: 0  / 10     Post Procedural Pain: 0 / 10     Response to treatment: Well tolerated by patient. Culture obtained, continue Bactrim. Plan:     Discharge Instructions       PHYSICIAN ORDERS AND DISCHARGE INSTRUCTIONS    NOTE: Upon discharge from the [address], you will receive a patient experience survey. We would be grateful if you would take the time to fill this survey out.     Wound care order history:     FLORIDALMA's   Right       Left    Date:   Cultures:     Grafts:     Antibiotics:        Continuing wound care orders and information:                Residence:                Continue home health care with:    Your wound-care supplies will be provided by: Wound cleansing:     Do not scrub or use excessive force. Wash hands with soap and water before and after dressing changes. Prior to applying a clean dressing, cleanse wound with normal saline, wound cleanser, or mild soap and water. Ask the physician or nurse before getting the wound(s) wet in a shower    Daily Wound management:   Keep weight off wounds and reposition every 2 hours. Avoid standing for long periods of time. Apply wraps/stockings in AM and remove at bedtime. If swelling is present, elevate legs to the level of the heart or above for 30 minutes 4-5 times a day and/or when sitting. When taking antibiotics take entire prescription as ordered by physician do not stop taking until medicine is all gone. Orders for this week:  12/14/20     Abdominal Wound-- Clean with soap and water, Pat dry. Apply Delmis to wound bed, cover with ABD pad and secure with paper tape. Change Daily                  Left Underarm-- Clean with soap and water. Pat Dry. Cover with ABD pad and paper tape. Clean with cotton swab to keep wound open until it heals. Follow up with Pia MIRANDA  In 1 weeks in the wound care center  Call [phone] for any questions or concerns.   Date__________   Time____________        Treatment Note Wound 12/14/20 Pelvis Anterior #1 Abdomen-Dressing/Treatment: (abd, tape)  Wound 12/14/20 Left #2 Left Underarm-Dressing/Treatment: (delmis, abd, paper tape)    Written Patient Dismissal Instructions Given          Electronically signed by STACEY Manuel CNP on 12/14/2020 at 12:21 PM

## 2020-12-21 ENCOUNTER — HOSPITAL ENCOUNTER (OUTPATIENT)
Dept: WOUND CARE | Age: 50
Discharge: HOME | End: 2020-12-21
Payer: COMMERCIAL

## 2020-12-21 VITALS
RESPIRATION RATE: 16 BRPM | TEMPERATURE: 97.5 F | SYSTOLIC BLOOD PRESSURE: 182 MMHG | DIASTOLIC BLOOD PRESSURE: 69 MMHG | HEART RATE: 71 BPM

## 2020-12-21 LAB
CULTURE: ABNORMAL
CULTURE: ABNORMAL
Lab: ABNORMAL
SPECIMEN: ABNORMAL

## 2020-12-21 PROCEDURE — 99024 POSTOP FOLLOW-UP VISIT: CPT | Performed by: NURSE PRACTITIONER

## 2020-12-21 PROCEDURE — 99213 OFFICE O/P EST LOW 20 MIN: CPT

## 2020-12-21 RX ORDER — GINSENG 100 MG
CAPSULE ORAL ONCE
Status: CANCELLED | OUTPATIENT
Start: 2020-12-21 | End: 2020-12-21

## 2020-12-21 RX ORDER — SULFAMETHOXAZOLE AND TRIMETHOPRIM 800; 160 MG/1; MG/1
1 TABLET ORAL 2 TIMES DAILY
COMMUNITY
End: 2020-12-29

## 2020-12-21 RX ORDER — BACITRACIN, NEOMYCIN, POLYMYXIN B 400; 3.5; 5 [USP'U]/G; MG/G; [USP'U]/G
OINTMENT TOPICAL ONCE
Status: CANCELLED | OUTPATIENT
Start: 2020-12-21 | End: 2020-12-21

## 2020-12-21 RX ORDER — LIDOCAINE 50 MG/G
OINTMENT TOPICAL ONCE
Status: CANCELLED | OUTPATIENT
Start: 2020-12-21 | End: 2020-12-21

## 2020-12-21 RX ORDER — GENTAMICIN SULFATE 1 MG/G
OINTMENT TOPICAL ONCE
Status: CANCELLED | OUTPATIENT
Start: 2020-12-21 | End: 2020-12-21

## 2020-12-21 RX ORDER — BETAMETHASONE DIPROPIONATE 0.05 %
OINTMENT (GRAM) TOPICAL ONCE
Status: CANCELLED | OUTPATIENT
Start: 2020-12-21 | End: 2020-12-21

## 2020-12-21 RX ORDER — CLOBETASOL PROPIONATE 0.5 MG/G
OINTMENT TOPICAL ONCE
Status: CANCELLED | OUTPATIENT
Start: 2020-12-21 | End: 2020-12-21

## 2020-12-21 RX ORDER — LIDOCAINE HYDROCHLORIDE 40 MG/ML
SOLUTION TOPICAL ONCE
Status: CANCELLED | OUTPATIENT
Start: 2020-12-21 | End: 2020-12-21

## 2020-12-21 RX ORDER — BACITRACIN ZINC AND POLYMYXIN B SULFATE 500; 1000 [USP'U]/G; [USP'U]/G
OINTMENT TOPICAL ONCE
Status: CANCELLED | OUTPATIENT
Start: 2020-12-21 | End: 2020-12-21

## 2020-12-21 RX ORDER — LIDOCAINE HYDROCHLORIDE 20 MG/ML
JELLY TOPICAL ONCE
Status: CANCELLED | OUTPATIENT
Start: 2020-12-21 | End: 2020-12-21

## 2020-12-21 ASSESSMENT — PAIN DESCRIPTION - FREQUENCY: FREQUENCY: INTERMITTENT

## 2020-12-21 ASSESSMENT — PAIN DESCRIPTION - PROGRESSION: CLINICAL_PROGRESSION: NOT CHANGED

## 2020-12-21 ASSESSMENT — PAIN SCALES - GENERAL: PAINLEVEL_OUTOF10: 0

## 2020-12-21 ASSESSMENT — PAIN - FUNCTIONAL ASSESSMENT: PAIN_FUNCTIONAL_ASSESSMENT: PREVENTS OR INTERFERES SOME ACTIVE ACTIVITIES AND ADLS

## 2020-12-21 NOTE — PROGRESS NOTES
Wound Care Center Progress Note       Timothy Wright  AGE: 53 y.o. GENDER: female  : 1970  TODAY'S DATE:  2020        Subjective:     Chief Complaint   Patient presents with    Wound Check         HISTORY of PRESENT ILLNESS     Timothy Wright is a 53 y.o. female who presents to the [address] [place] [address] for evaluation and treatment of Acute non-healing surgical  wound of abdomen/pelvis region post hysterectomy 2020. The condition is of mild severity. She also has an abscess in the left axilla which has now resolved. The underlying cause is thought to be nonhealing surgical and infection. The patients care to date has included evaluation at the walk in clinic and Bactrim DS for 10 days which was started 2020.  The patient has significant underlying medical conditions as below.      Wound Pain Timing/Severity:none  Quality of pain:N/A  Severity of pain:  0 / 10   Modifying Factors: obesity and prediabetes  Associated Signs/Symptoms: edema    PAST MEDICAL HISTORY        Diagnosis Date    Asthma     Hypertension        PAST SURGICAL HISTORY    Past Surgical History:   Procedure Laterality Date    HYSTERECTOMY         FAMILY HISTORY    Family History   Problem Relation Age of Onset    Diabetes Maternal Grandfather        SOCIAL HISTORY    Social History     Tobacco Use    Smoking status: Never Smoker    Smokeless tobacco: Never Used   Substance Use Topics    Alcohol use: Not Currently     Frequency: Never    Drug use: Never       ALLERGIES    No Known Allergies    MEDICATIONS    Current Outpatient Medications on File Prior to Encounter   Medication Sig Dispense Refill    sulfamethoxazole-trimethoprim (BACTRIM DS;SEPTRA DS) 800-160 MG per tablet Take 1 tablet by mouth 2 times daily      potassium chloride (MICRO-K) 10 MEQ extended release capsule Take 10 mEq by mouth 2 times daily      orlistat (XENICAL) 120 MG capsule Take 1 capsule by mouth 3 times daily (with meals) Start with BID and then increase after 5 days to TID. 90 capsule 0    lisinopril (PRINIVIL;ZESTRIL) 5 MG tablet Take 5 mg by mouth daily       No current facility-administered medications on file prior to encounter. REVIEW OF SYSTEMS    Pertinent items are noted in HPI. Constitutional: Negative for systemic symptoms including fever, chills and malaise. Objective:      BP (!) 182/69   Pulse 71   Temp 97.5 °F (36.4 °C) (Temporal)   Resp 16     PHYSICAL EXAM    General: The patient is in no acute distress. Mental status:  Patient is appropriate, is  oriented to place and plan of care. Dermatologic exam: Visual inspection of the periwound reveals the skin to be normal in turgor and texture.   Wound exam:  see wound description below     All active wounds listed below with today's date are evaluated      Wound 12/14/20 Pelvis Anterior #1 Abdomen (Active)   Wound Image   12/14/20 1044   Dressing Status New dressing applied 12/21/20 1134   Wound Cleansed Wound cleanser 12/21/20 1119   Wound Length (cm) 0.1 cm 12/21/20 1119   Wound Width (cm) 0.7 cm 12/21/20 1119   Wound Depth (cm) 0.1 cm 12/21/20 1119   Wound Surface Area (cm^2) 0.07 cm^2 12/21/20 1119   Change in Wound Size % (l*w) 76.67 12/21/20 1119   Wound Volume (cm^3) 0.01 cm^3 12/21/20 1119   Wound Healing % 67 12/21/20 1119   Distance Tunneling (cm) 0 cm 12/21/20 1119   Tunneling Position ___ O'Clock 0 12/21/20 1119   Undermining Starts ___ O'Clock 0 12/21/20 1119   Undermining Ends___ O'Clock 0 12/21/20 1119   Undermining Maxium Distance (cm) 0 12/21/20 1119   Wound Assessment Pink/red 12/21/20 1119   Drainage Amount Small 12/21/20 1119   Drainage Description Clear 12/21/20 1119   Odor None 12/21/20 1119   Coni-wound Assessment Intact 12/21/20 1119   Margins Defined edges 12/21/20 1119   Wound Thickness Description not for Pressure Injury Full thickness 12/21/20 1119   Number of days: 7       Assessment:       Problem List Items Addressed This Visit

## 2020-12-29 ENCOUNTER — HOSPITAL ENCOUNTER (OUTPATIENT)
Dept: WOUND CARE | Age: 50
Discharge: HOME | End: 2020-12-29
Payer: COMMERCIAL

## 2020-12-29 VITALS
TEMPERATURE: 97.7 F | RESPIRATION RATE: 16 BRPM | SYSTOLIC BLOOD PRESSURE: 177 MMHG | DIASTOLIC BLOOD PRESSURE: 85 MMHG | HEART RATE: 64 BPM

## 2020-12-29 PROBLEM — L02.411 ABSCESS OF RIGHT AXILLA: Status: ACTIVE | Noted: 2020-12-29

## 2020-12-29 PROCEDURE — 99212 OFFICE O/P EST SF 10 MIN: CPT

## 2020-12-29 PROCEDURE — 99213 OFFICE O/P EST LOW 20 MIN: CPT | Performed by: NURSE PRACTITIONER

## 2020-12-29 RX ORDER — BETAMETHASONE DIPROPIONATE 0.05 %
OINTMENT (GRAM) TOPICAL ONCE
Status: CANCELLED | OUTPATIENT
Start: 2020-12-29 | End: 2020-12-29

## 2020-12-29 RX ORDER — GINSENG 100 MG
CAPSULE ORAL ONCE
Status: CANCELLED | OUTPATIENT
Start: 2020-12-29 | End: 2020-12-29

## 2020-12-29 RX ORDER — LIDOCAINE HYDROCHLORIDE 40 MG/ML
SOLUTION TOPICAL ONCE
Status: DISCONTINUED | OUTPATIENT
Start: 2020-12-29 | End: 2020-12-30 | Stop reason: HOSPADM

## 2020-12-29 RX ORDER — GENTAMICIN SULFATE 1 MG/G
OINTMENT TOPICAL ONCE
Status: CANCELLED | OUTPATIENT
Start: 2020-12-29 | End: 2020-12-29

## 2020-12-29 RX ORDER — LIDOCAINE HYDROCHLORIDE 40 MG/ML
SOLUTION TOPICAL ONCE
Status: CANCELLED | OUTPATIENT
Start: 2020-12-29 | End: 2020-12-29

## 2020-12-29 RX ORDER — LIDOCAINE HYDROCHLORIDE 20 MG/ML
JELLY TOPICAL ONCE
Status: CANCELLED | OUTPATIENT
Start: 2020-12-29 | End: 2020-12-29

## 2020-12-29 RX ORDER — BACITRACIN ZINC AND POLYMYXIN B SULFATE 500; 1000 [USP'U]/G; [USP'U]/G
OINTMENT TOPICAL ONCE
Status: CANCELLED | OUTPATIENT
Start: 2020-12-29 | End: 2020-12-29

## 2020-12-29 RX ORDER — LIDOCAINE 50 MG/G
OINTMENT TOPICAL ONCE
Status: CANCELLED | OUTPATIENT
Start: 2020-12-29 | End: 2020-12-29

## 2020-12-29 RX ORDER — BACITRACIN, NEOMYCIN, POLYMYXIN B 400; 3.5; 5 [USP'U]/G; MG/G; [USP'U]/G
OINTMENT TOPICAL ONCE
Status: CANCELLED | OUTPATIENT
Start: 2020-12-29 | End: 2020-12-29

## 2020-12-29 RX ORDER — CLOBETASOL PROPIONATE 0.5 MG/G
OINTMENT TOPICAL ONCE
Status: CANCELLED | OUTPATIENT
Start: 2020-12-29 | End: 2020-12-29

## 2020-12-29 RX ORDER — SULFAMETHOXAZOLE AND TRIMETHOPRIM 800; 160 MG/1; MG/1
1 TABLET ORAL 2 TIMES DAILY
Qty: 20 TABLET | Refills: 0 | Status: SHIPPED | OUTPATIENT
Start: 2020-12-29 | End: 2021-01-08

## 2020-12-29 ASSESSMENT — PAIN DESCRIPTION - PAIN TYPE: TYPE: ACUTE PAIN

## 2020-12-29 ASSESSMENT — PAIN SCALES - GENERAL: PAINLEVEL_OUTOF10: 0

## 2020-12-29 NOTE — PLAN OF CARE
Problem: Pain:  Goal: Pain level will decrease  Description: Pain level will decrease  Outcome: Ongoing  Goal: Control of acute pain  Description: Control of acute pain  Outcome: Ongoing  Goal: Control of chronic pain  Description: Control of chronic pain  Outcome: Ongoing ///

## 2020-12-29 NOTE — PROGRESS NOTES
Wound Care Center Progress Note       Kristyn Yu  AGE: 53 y.o. GENDER: female  : 1970  TODAY'S DATE:  2020        Subjective:     Chief Complaint   Patient presents with    Wound Check         HISTORY of PRESENT ILLNESS    Mia Perez is a 53 y.o. female who presents to the Wound Clinic for evaluation and treatment of Acute non-healing surgical  wound of abdomen/pelvis region post hysterectomy 2020 which is now healed. She also has an abscess in the left axilla which has now resolved.  She is developing an abscess in her right axilla, will start Bactrim DS for 2 days. The underlying cause is thought to be nonhealing surgical and infection.  The patients care to date has included evaluation at the walk in clinic and Bactrim DS for 10 days which was started 2020. The patient has significant underlying medical conditions as below.      Wound Pain Timing/Severity:none  Quality of pain:N/A  Severity of pain:  0 / 10   Modifying Factors: obesity and prediabetes  Associated Signs/Symptoms: edema           PAST MEDICAL HISTORY        Diagnosis Date    Asthma     Hypertension        PAST SURGICAL HISTORY    Past Surgical History:   Procedure Laterality Date    HYSTERECTOMY         FAMILY HISTORY    Family History   Problem Relation Age of Onset    Diabetes Maternal Grandfather        SOCIAL HISTORY    Social History     Tobacco Use    Smoking status: Never Smoker    Smokeless tobacco: Never Used   Substance Use Topics    Alcohol use: Not Currently     Frequency: Never    Drug use: Never       ALLERGIES    No Known Allergies    MEDICATIONS    Current Outpatient Medications on File Prior to Encounter   Medication Sig Dispense Refill    potassium chloride (MICRO-K) 10 MEQ extended release capsule Take 10 mEq by mouth 2 times daily      orlistat (XENICAL) 120 MG capsule Take 1 capsule by mouth 3 times daily (with meals) Start with BID and then increase after 5 days to TID.  80 capsule 0    lisinopril (PRINIVIL;ZESTRIL) 5 MG tablet Take 5 mg by mouth daily       No current facility-administered medications on file prior to encounter. REVIEW OF SYSTEMS    Pertinent items are noted in HPI. Constitutional: Negative for systemic symptoms including fever, chills and malaise. Objective:      BP (!) 177/85   Pulse 64   Temp 97.7 °F (36.5 °C) (Temporal)   Resp 16     PHYSICAL EXAM    General: The patient is in no acute distress. Mental status:  Patient is appropriate, is  oriented to place and plan of care. Dermatologic exam: Visual inspection of the periwound reveals the skin to be normal in turgor and texture. Wound exam:  see wound description below     All active wounds listed below with today's date are evaluated      Assessment:       Problem List Items Addressed This Visit     WD-Nonhealing nonsurgical abdomen wound with fat layer exposed - Primary    Relevant Medications    lidocaine (XYLOCAINE) 4 % external solution (Start on 12/29/2020 11:30 AM)    Other Relevant Orders    Supply: Wound Cleanser    WD-Abscess of axilla, left    Relevant Medications    lidocaine (XYLOCAINE) 4 % external solution (Start on 12/29/2020 11:30 AM)    Other Relevant Orders    Supply: Wound Cleanser    WD-Abscess of right axilla          Status of wound progress and description from last visit: Healed. Plan:     Discharge Instructions       PHYSICIAN ORDERS AND DISCHARGE INSTRUCTIONS     NOTE: Upon discharge from the [address], you will receive a patient experience survey.  We would be grateful if you would take the time to fill this survey out.     Wound care order history:                 FLORIDALMA's   Right       Left               Date:              Cultures:                Grafts:                Antibiotics:           Continuing wound care orders and information:                 Residence:                Continue home health care with:               Your wound-care supplies will be provided by:      Wound cleansing:               ZT not scrub or use excessive force.              Wash hands with soap and water before and after dressing changes.             VLDVD to applying a clean dressing, cleanse wound with normal saline, wound cleanser, or mild soap and water.               Ask the physician or nurse before getting the wound(s) wet in a shower     Daily Wound management:              Keep weight off wounds and reposition every 2 hours.              Avoid standing for long periods of time.              Apply wraps/stockings in AM and remove at bedtime.              If swelling is present, elevate legs to the level of the heart or above for 30 minutes 4-5 times a day and/or when sitting.                                      When taking antibiotics take entire prescription as ordered by physician do not stop taking until medicine is all gone.                                                           Orders for this week:  12/29/20                Abdominal Wound-- Clean with soap and water, Pat dry.                Discharged  Call [phone] for any questions or concerns.   Date__________   Time____________        Treatment Note      Written Patient Dismissal Instructions Given            Electronically signed by STACEY Whitfield CNP on 12/29/2020 at 11:18 AM

## 2022-01-06 ENCOUNTER — HOSPITAL ENCOUNTER (EMERGENCY)
Age: 52
Discharge: HOME | End: 2022-01-06
Attending: EMERGENCY MEDICINE
Payer: COMMERCIAL

## 2022-01-06 VITALS
SYSTOLIC BLOOD PRESSURE: 189 MMHG | BODY MASS INDEX: 41.47 KG/M2 | OXYGEN SATURATION: 99 % | DIASTOLIC BLOOD PRESSURE: 77 MMHG | HEART RATE: 56 BPM | TEMPERATURE: 96.6 F | HEIGHT: 69 IN | RESPIRATION RATE: 14 BRPM | WEIGHT: 280 LBS

## 2022-01-06 DIAGNOSIS — M62.838 SPASM OF MUSCLE: ICD-10-CM

## 2022-01-06 DIAGNOSIS — U07.1 COVID-19: ICD-10-CM

## 2022-01-06 DIAGNOSIS — S39.012A STRAIN OF LUMBAR REGION, INITIAL ENCOUNTER: Primary | ICD-10-CM

## 2022-01-06 PROCEDURE — 99282 EMERGENCY DEPT VISIT SF MDM: CPT

## 2022-01-06 RX ORDER — METHOCARBAMOL 500 MG/1
500 TABLET, FILM COATED ORAL 4 TIMES DAILY
Qty: 40 TABLET | Refills: 0 | Status: SHIPPED | OUTPATIENT
Start: 2022-01-06 | End: 2022-01-16

## 2022-01-06 RX ORDER — NAPROXEN 500 MG/1
500 TABLET ORAL 2 TIMES DAILY PRN
Qty: 20 TABLET | Refills: 0 | Status: SHIPPED | OUTPATIENT
Start: 2022-01-06 | End: 2022-01-16

## 2022-01-06 RX ORDER — LIDOCAINE 50 MG/G
1 PATCH TOPICAL DAILY
Qty: 30 PATCH | Refills: 0 | Status: SHIPPED | OUTPATIENT
Start: 2022-01-06

## 2022-01-06 ASSESSMENT — PAIN DESCRIPTION - FREQUENCY: FREQUENCY: CONTINUOUS

## 2022-01-06 ASSESSMENT — PAIN SCALES - GENERAL: PAINLEVEL_OUTOF10: 6

## 2022-01-06 ASSESSMENT — PAIN DESCRIPTION - LOCATION: LOCATION: BACK

## 2022-01-06 ASSESSMENT — PAIN DESCRIPTION - DESCRIPTORS: DESCRIPTORS: ACHING

## 2022-01-06 ASSESSMENT — PAIN DESCRIPTION - PAIN TYPE: TYPE: ACUTE PAIN

## 2022-01-06 ASSESSMENT — PAIN DESCRIPTION - ORIENTATION: ORIENTATION: LOWER

## 2022-01-06 NOTE — ED NOTES
The patient presents to the er today with complaints of lower back pain secondary to being diagnosed with COVID-19 on Tuesday.          Sylvester Last RN  01/06/22 1046

## 2022-01-06 NOTE — ED NOTES
Discharge instructions and prescriptions were reviewed and the patient will follow up with Dr. Diamante Deng. The patient understands these instructions.       Corinne General, RN  01/06/22 1103

## 2022-01-06 NOTE — ED PROVIDER NOTES
Emergency Department Encounter    Patient: Hero Sweet  MRN: [id number]  : 1970  Date of Evaluation: 2022  ED Provider:  Emily Martinez MD    Triage Chief Complaint:   Back Pain (reports of testing positive for COVID-19 on Tuesday reports low back pain since)    [place]:  Hero Sweet is a 49 y.o. female that presents with complaint of back pain. Has had URI type symptoms and body aches, diagnosed with covid on Tuesday. Has had cough, intermittent wheezing, has asthma. Radiates from flank/low back, constant, doesn't sleep well related to the pain and body aches. No incontinence. No weakness or numbness in extremities. No shortness of breath. No dysuria. No headache. No nausea or vomiting. Has had some diarrhea after went to urgent care and was given prescription for levaquin. Had been lifting and moving her mother around a lot as well recently as she had been sick.          ROS - see HPI, below listed is current ROS at time of my eval:  10 systems reviewed and negative except as above         Past Medical History:   Diagnosis Date    Asthma     Hypertension      Past Surgical History:   Procedure Laterality Date    HYSTERECTOMY       Family History   Problem Relation Age of Onset    Diabetes Maternal Grandfather      Social History     Socioeconomic History    Marital status:      Spouse name: Not on file    Number of children: Not on file    Years of education: Not on file    Highest education level: Not on file   Occupational History     Employer: [employer]   Tobacco Use    Smoking status: Never Smoker    Smokeless tobacco: Never Used   Vaping Use    Vaping Use: Never used   Substance and Sexual Activity    Alcohol use: Not Currently    Drug use: Never    Sexual activity: Not Currently   Other Topics Concern    Not on file   Social History Narrative    Not on file     Social Determinants of Health     Financial Resource Strain:     Difficulty of Paying Living Expenses: Not on file   Food Insecurity:     Worried About [address] in the Last Year: Not on file    Staci of Food in the Last Year: Not on file   Transportation Needs:     Lack of Transportation (Medical): Not on file    Lack of Transportation (Non-Medical): Not on file   Physical Activity:     Days of Exercise per Week: Not on file    Minutes of Exercise per Session: Not on file   Stress:     Feeling of Stress : Not on file   Social Connections:     Frequency of Communication with Friends and Family: Not on file    Frequency of Social Gatherings with Friends and Family: Not on file    Attends Restoration Services: Not on file    Active Member of [address] [place] [address] or Organizations: Not on file    Attends Club or Organization Meetings: Not on file    Marital Status: Not on file   Intimate Partner Violence:     Fear of Current or Ex-Partner: Not on file    Emotionally Abused: Not on file    Physically Abused: Not on file    Sexually Abused: Not on file   Housing Stability:     Unable to Pay for Housing in the Last Year: Not on file    Number of [place] in the Last Year: Not on file    Unstable Housing in the Last Year: Not on file     No current facility-administered medications for this encounter. Current Outpatient Medications   Medication Sig Dispense Refill    methocarbamol (ROBAXIN) 500 MG tablet Take 1 tablet by mouth 4 times daily for 10 days 40 tablet 0    naproxen (NAPROSYN) 500 MG tablet Take 1 tablet by mouth 2 times daily as needed for Pain 20 tablet 0    lidocaine (LIDODERM) 5 % Place 1 patch onto the skin daily 12 hours on, 12 hours off. 30 patch 0    potassium chloride (MICRO-K) 10 MEQ extended release capsule Take 10 mEq by mouth 2 times daily      lisinopril (PRINIVIL;ZESTRIL) 5 MG tablet Take 5 mg by mouth daily      orlistat (XENICAL) 120 MG capsule Take 1 capsule by mouth 3 times daily (with meals) Start with BID and then increase after 5 days to TID. (Patient not taking: Reported on 1/6/2022) 90 capsule 0     No Known Allergies    Nursing Notes Reviewed    Physical Exam:  Triage VS:    ED Triage Vitals [01/06/22 1001]   Enc Vitals Group      BP (!) 189/77      Pulse 56      Resp 14      Temp       Temp Source Infrared      SpO2 99 %      Weight 280 lb (127 kg)      Height 5' 9\" (1.753 m)      Head Circumference       Peak Flow       Pain Score       Pain Loc       Pain Edu? Excl. in [address]? My pulse ox interpretation is - normal    General appearance:  No acute distress. Skin:  Warm. Dry. Eye:  Extraocular movements intact. Ears, nose, mouth and throat:  Oral mucosa moist   Neck:  Trachea midline. Extremity:  No swelling. Normal ROM     Heart:  Regular rate and rhythm, normal S1 & S2, no extra heart sounds. Perfusion:  intact  Respiratory:  Lungs clear to auscultation bilaterally. Respirations nonlabored. Abdominal: Soft. Nontender. Non distended. Back:  No CVA tenderness to palpation, + bilateral lumbar paraspinal muscle tenderness, reproduces pain and she jumped on the bed. Neurological:  Alert and oriented, normal gait. Psychiatric:  Appropriate    I have reviewed and interpreted all of the currently available lab results from this visit (if applicable):  No results found for this visit on 01/06/22. Radiographs (if obtained):  Radiologist's Report Reviewed:  No results found. EKG (if obtained): (All EKG's are interpreted by myself in the absence of a cardiologist)      MDM:  28-year-old female with history as above presents with concern for back pain, she does have Covid, has been laying around a lot, also was moving her mom around, does have paraspinal muscle tenderness on my exam, no urinary symptoms, does appear to have some spasm, I suspect that this is all musculoskeletal, plan for medications for home, she does not want anything that will be very sedating.   She is not having any respiratory symptoms, her vitals are normal and she is in no distress. Plan for discharge, she is comfortable, all questions answered, discharged in stable condition. Return precautions given    Clinical Impression:  1. Strain of lumbar region, initial encounter    2. Spasm of muscle    3. COVID-19      Disposition referral (if applicable):  Dejuan Ortega MD  [facility]  [phone]          Disposition medications (if applicable):  Discharge Medication List as of 1/6/2022 10:52 AM      START taking these medications    Details   methocarbamol (ROBAXIN) 500 MG tablet Take 1 tablet by mouth 4 times daily for 10 days, Disp-40 tablet, R-0Normal      naproxen (NAPROSYN) 500 MG tablet Take 1 tablet by mouth 2 times daily as needed for Pain, Disp-20 tablet, R-0Normal      lidocaine (LIDODERM) 5 % Place 1 patch onto the skin daily 12 hours on, 12 hours off., Disp-30 patch, R-0Normal           ED Provider Disposition Time  DISPOSITION Decision To Discharge 01/06/2022 10:48:28 AM      Comment: Please note this report has been produced using speech recognition software and may contain errors related to that system including errors in grammar, punctuation, and spelling, as well as words and phrases that may be inappropriate. Efforts were made to edit the dictations.         Tamela Hammans, MD  01/06/22 6705

## 2023-04-06 ENCOUNTER — HOSPITAL ENCOUNTER (OUTPATIENT)
Age: 53
Discharge: HOME | End: 2023-04-06
Payer: COMMERCIAL

## 2023-04-06 ENCOUNTER — HOSPITAL ENCOUNTER (OUTPATIENT)
Dept: GENERAL RADIOLOGY | Age: 53
Discharge: HOME | End: 2023-04-06
Payer: COMMERCIAL

## 2023-04-06 DIAGNOSIS — R06.02 SOB (SHORTNESS OF BREATH): ICD-10-CM

## 2023-04-06 PROCEDURE — 71046 X-RAY EXAM CHEST 2 VIEWS: CPT

## 2024-06-04 ENCOUNTER — OFFICE VISIT MH/BH (OUTPATIENT)
Dept: BARIATRICS/WEIGHT MGMT | Age: 54
End: 2024-06-04
Payer: COMMERCIAL

## 2024-06-04 VITALS
HEART RATE: 64 BPM | WEIGHT: 273.3 LBS | OXYGEN SATURATION: 98 % | DIASTOLIC BLOOD PRESSURE: 86 MMHG | HEIGHT: 70 IN | BODY MASS INDEX: 39.13 KG/M2 | SYSTOLIC BLOOD PRESSURE: 124 MMHG

## 2024-06-04 DIAGNOSIS — Z79.899 MEDICATION MANAGEMENT: Primary | ICD-10-CM

## 2024-06-04 DIAGNOSIS — E66.9 OBESITY (BMI 30-39.9): ICD-10-CM

## 2024-06-04 PROCEDURE — 93000 ELECTROCARDIOGRAM COMPLETE: CPT | Performed by: NURSE PRACTITIONER

## 2024-06-04 PROCEDURE — 3017F COLORECTAL CA SCREEN DOC REV: CPT | Performed by: NURSE PRACTITIONER

## 2024-06-04 PROCEDURE — 1036F TOBACCO NON-USER: CPT | Performed by: NURSE PRACTITIONER

## 2024-06-04 PROCEDURE — G8427 DOCREV CUR MEDS BY ELIG CLIN: HCPCS | Performed by: NURSE PRACTITIONER

## 2024-06-04 PROCEDURE — 99214 OFFICE O/P EST MOD 30 MIN: CPT | Performed by: NURSE PRACTITIONER

## 2024-06-04 PROCEDURE — G8417 CALC BMI ABV UP PARAM F/U: HCPCS | Performed by: NURSE PRACTITIONER

## 2024-06-04 RX ORDER — SENNOSIDES A AND B 8.6 MG/1
1 TABLET, FILM COATED ORAL 2 TIMES DAILY
Qty: 60 TABLET | Refills: 11 | Status: SHIPPED | OUTPATIENT
Start: 2024-06-04 | End: 2025-06-04

## 2024-06-04 RX ORDER — PHENTERMINE HYDROCHLORIDE 37.5 MG/1
37.5 TABLET ORAL
Qty: 30 TABLET | Refills: 0 | Status: SHIPPED | OUTPATIENT
Start: 2024-06-04 | End: 2024-07-04

## 2024-06-04 ASSESSMENT — ENCOUNTER SYMPTOMS: CONSTIPATION: 1

## 2024-06-04 NOTE — PROGRESS NOTES
and Affect: Mood normal.         Behavior: Behavior normal.           ASSESSMENT:  1. Obesity (BMI 30-39.9)  - Start tracking calories; about 6714-6242 daily  - Limit carb intake  - Eat 5-6 small meals/ snacks daily  - 64 oz water daily  - Increase activity as able  - Referral RD    2. Medication management  - Limited on medications per insurance coverage  - Not interested in bariatric surgery or GLP-1  - Most recent lab results reviewed  - Has taken Adipex in past without side effects  - Patient intolerant to Topamax  - EKG completed in office and reviewed  - Patient would like to restart Adipex  - RX sent to pharmacy  - Start in am and take as directed- 1/2 pill daily for one week; then full dose daily there after  - May start Senna 1-4 tabs daily as needed for constipation  - call for any concerns at all  - RTC one month      PLAN:    - Patient will start on Adipex in am and take as directed    - Call for adverse side effects or concerns    - BMI is over 30, or over 27 with weight-related risk factors.    - Patient aware that weight must be lost at each visit or medication will be discontinued.     - Patient must lose 5% of body weight every 3 months to remain on medication.    - Patient is aware that in order to be successful, must combine Adipex with appropriate diet and exercise plan.     - Pt provided with medication handout that covers use; side effects (common side effects include insomnia, dry mouth, constipation, nervousness, increased heart rate, hypertension);    precautions; and interactions.     - Pt aware must meet monthly in person while on this medication.     - Check [facility] Rx Reporting System. Any concerns: NONE Reported     - Current birth control measures include: hysterectomy    - If elderly or renal impairment, will use lower dose (15 mg daily) and avoid all together if GFR is less than 15. N/A     - RTC in one month    Orders Placed This Encounter   Procedures    Amb Referral to

## 2024-07-10 ENCOUNTER — OFFICE VISIT (OUTPATIENT)
Dept: BARIATRICS/WEIGHT MGMT | Age: 54
End: 2024-07-10
Payer: COMMERCIAL

## 2024-07-10 VITALS
HEIGHT: 70 IN | WEIGHT: 275.5 LBS | DIASTOLIC BLOOD PRESSURE: 90 MMHG | OXYGEN SATURATION: 98 % | BODY MASS INDEX: 39.44 KG/M2 | SYSTOLIC BLOOD PRESSURE: 120 MMHG | HEART RATE: 67 BPM

## 2024-07-10 DIAGNOSIS — Z79.899 MEDICATION MANAGEMENT: Primary | ICD-10-CM

## 2024-07-10 DIAGNOSIS — E66.01 MORBID OBESITY WITH BMI OF 40.0-44.9, ADULT (HCC): ICD-10-CM

## 2024-07-10 PROCEDURE — G8417 CALC BMI ABV UP PARAM F/U: HCPCS | Performed by: NURSE PRACTITIONER

## 2024-07-10 PROCEDURE — G8427 DOCREV CUR MEDS BY ELIG CLIN: HCPCS | Performed by: NURSE PRACTITIONER

## 2024-07-10 PROCEDURE — 99214 OFFICE O/P EST MOD 30 MIN: CPT | Performed by: NURSE PRACTITIONER

## 2024-07-10 PROCEDURE — 3017F COLORECTAL CA SCREEN DOC REV: CPT | Performed by: NURSE PRACTITIONER

## 2024-07-10 PROCEDURE — 1036F TOBACCO NON-USER: CPT | Performed by: NURSE PRACTITIONER

## 2024-07-10 RX ORDER — PHENTERMINE HYDROCHLORIDE 37.5 MG/1
37.5 TABLET ORAL
Qty: 30 TABLET | Refills: 0 | Status: SHIPPED | OUTPATIENT
Start: 2024-07-10 | End: 2024-08-09

## 2024-07-10 NOTE — PROGRESS NOTES
Chief Complaint   Patient presents with    Weight Management     Med WM Adipex #2         SUBJECTIVE:    HPI: Patient is here with complaints of: Monthly Adipex visit.    Obesity with a BMI of Body mass index is 40.1 kg/m²..    Current weight: 275 pounds    Weight change since last visit: +2.2 pounds (if pt failed to lose weight, cannot remain on medication).    Month 2 of being on Adipex.     Any new absolute contraindications (glaucoma, drug abuse, CAD, uncontrolled HTN, arrhythmias, stroke, CHF, hyperthyroidism, MAOI use, pregnant or breastfeeding) to being on medication: DENIES     Pt complains of the following side effects: DENIES    Current dietary measures: not tracking calories; eating carbs; not following a diet plan    Current exercise measures: goes to gym several times a week     I have reviewed the patient's(pertinent information to this visit) medical history, family history(scanned in  the [employer] under \"patient questioner\"), social history and review of systems with the patient today in the office.          Past Surgical History:   Procedure Laterality Date    HYSTERECTOMY (CERVIX STATUS UNKNOWN)         Past Medical History:   Diagnosis Date    Asthma     Hypertension        Family History   Problem Relation Age of Onset    Diabetes Maternal Grandfather        Social History     Socioeconomic History    Marital status:      Spouse name: Not on file    Number of children: Not on file    Years of education: Not on file    Highest education level: Not on file   Occupational History     Employer: [employer]   Tobacco Use    Smoking status: Never    Smokeless tobacco: Never   Vaping Use    Vaping Use: Never used   Substance and Sexual Activity    Alcohol use: Yes     Comment: occasional    Drug use: Never    Sexual activity: Not Currently   Other Topics Concern    Not on file   Social History Narrative    Not on file     Social Determinants of Health     Financial Resource Strain: